# Patient Record
Sex: MALE
[De-identification: names, ages, dates, MRNs, and addresses within clinical notes are randomized per-mention and may not be internally consistent; named-entity substitution may affect disease eponyms.]

---

## 2019-04-11 ENCOUNTER — HOSPITAL ENCOUNTER (EMERGENCY)
Dept: HOSPITAL 14 - H.ER | Age: 24
Discharge: HOME | End: 2019-04-11
Payer: COMMERCIAL

## 2019-04-11 VITALS — RESPIRATION RATE: 18 BRPM | OXYGEN SATURATION: 100 %

## 2019-04-11 VITALS — TEMPERATURE: 98.4 F | HEART RATE: 99 BPM | SYSTOLIC BLOOD PRESSURE: 131 MMHG | DIASTOLIC BLOOD PRESSURE: 82 MMHG

## 2019-04-11 VITALS — BODY MASS INDEX: 30.8 KG/M2

## 2019-04-11 DIAGNOSIS — F41.9: ICD-10-CM

## 2019-04-11 DIAGNOSIS — R11.10: Primary | ICD-10-CM

## 2019-04-11 NOTE — ED PDOC
HPI: Psych/Substance Abuse


Time Seen by Provider: 04/11/19 01:00


Chief Complaint (Nursing): Psychiatric Evaluation


Chief Complaint (Provider): agitated


History Per: Patient, EMS


Additional Complaint(s): 





22 y/o male brought in by EMS for evaluation.  As per EMS, 911 was called to 

report a noise complaint by patient's neighbor, and when EMS arrived patient was

aggressive and agitated.  Patient calm at present; states he got "frustrated" 

tonight because he threw up after eating his [employer] cheesy bread and got mad 

about that so was slamming things and yelling and his neighbor called.  Patient 

denies suicidal/homicidal ideations, alcohol/drug use, chest pain, shortness of 

breath, palpitations, abdominal pain, changes in bowel movements.   





Past Medical History


Reviewed: Historical Data, Nursing Documentation, Vital Signs


Vital Signs: 





                                Last Vital Signs











Temp  98.3 F   04/11/19 00:40


 


Pulse  115 H  04/11/19 00:40


 


Resp  18   04/11/19 00:40


 


BP  130/76   04/11/19 00:40


 


Pulse Ox  100   04/11/19 00:40














- Medical History


PMH: No Chronic Diseases





- Surgical History


Surgical History: No Surg Hx





- Family History


Family History: States: No Known Family Hx





- Living Arrangements


Living Arrangements: Other (roommates)





- Allergies


Allergies/Adverse Reactions: 


                                    Allergies











Allergy/AdvReac Type Severity Reaction Status Date / Time


 


No Known Allergies Allergy   Verified 04/11/19 00:40














Review of Systems


ROS Statement: Except As Marked, All Systems Reviewed And Found Negative


Psych: Positive for: Other (agitated, aggressive)





Physical Exam





- Reviewed


Nursing Documentation Reviewed: Yes


Vital Signs Reviewed: Yes





- Physical Exam


Appears: Positive for: Well, Non-toxic, No Acute Distress


Head Exam: Positive for: ATRAUMATIC, NORMAL INSPECTION, NORMOCEPHALIC


Skin: Positive for: Normal Color


Eye Exam: Positive for: Normal appearance


ENT: Positive for: Normal ENT Inspection


Cardiovascular/Chest: Positive for: Regular Rate, Rhythm


Respiratory: Positive for: Normal Breath Sounds


Gastrointestinal/Abdominal: Positive for: Normal Exam


Back: Positive for: Normal Inspection


Extremity: Positive for: Normal ROM


Neurological/Psych: Positive for: Awake, Alert, Oriented (x3)





- ECG


O2 Sat by Pulse Oximetry: 100





- Progress


ED Course And Treament: 





-crisis eval





Patient evaluated by crisis worker; does not meet criteria for admission at this

time as per Dr. Frias.  Information given for outpatient follow up





Patient tolerating PO on re-eval, denies abdominal pain, nausea.


Patient was advised to follow up with PMD within 2-3 days


Diet modification





Return precautions given











Disposition





- Clinical Impression


Clinical Impression: 


 Vomiting, Anxiety








- Patient ED Disposition


Is Patient to be Admitted: No


Counseled Patient/Family Regarding: Studies Performed, Diagnosis, Need For 

Followup





- Disposition


Disposition: Routine/Home


Disposition Time: 03:06


Condition: IMPROVED


Instructions:  Nausea and Vomiting, Adult, Anxiety, Adult (DC)


Forms:  [facility] (English)